# Patient Record
(demographics unavailable — no encounter records)

---

## 2024-10-08 NOTE — HISTORY OF PRESENT ILLNESS
[de-identified] : Patient referred by Dr Dobbins for evaluation of enlarging anterior neck mass.  2 years ago patient noted small anterior neck swelling which is increased in size.  Has had no imaging.  Denies shortness of breath, dysphagia, change in voice or radiation exposure.  Patient reports mild weight gain. 5/31/24 excision anterior neck mass. path benign.  denies pain or drainage.  I have reviewed all old and new data and available images.  Additional information was obtained from others present at the time of the visit to ensure the completeness of the history.

## 2024-10-08 NOTE — ASSESSMENT
[FreeTextEntry1] : Patient with enlarging anterior neck mass.  Doing well status post excision.  Scar minimizing discussed.  RTO 4 months resolution.  I have answered their questions to the best my ability.

## 2024-10-08 NOTE — PHYSICAL EXAM
[de-identified] : Left anterior incision healing with mild keloid ,  scar min discussed, no cervical or supraclavicular adenopathy, trachea midline, thyroid without enlargement or palpable mass [Normal] : no neck adenopathy [de-identified] : Skin:  normal appearance.  no rash, nodules, vesicles, or erythema, Musculoskeletal:  full range of motion and no deformities appreciated Neurological:  grossly intact Psychiatric:  oriented to person, place and time with appropriate affect